# Patient Record
Sex: FEMALE | Race: ASIAN | NOT HISPANIC OR LATINO | Employment: UNEMPLOYED | URBAN - METROPOLITAN AREA
[De-identification: names, ages, dates, MRNs, and addresses within clinical notes are randomized per-mention and may not be internally consistent; named-entity substitution may affect disease eponyms.]

---

## 2022-03-21 ENCOUNTER — APPOINTMENT (OUTPATIENT)
Dept: RADIOLOGY | Facility: HOSPITAL | Age: 72
End: 2022-03-21
Attending: EMERGENCY MEDICINE
Payer: COMMERCIAL

## 2022-03-21 ENCOUNTER — HOSPITAL ENCOUNTER (EMERGENCY)
Facility: HOSPITAL | Age: 72
Discharge: HOME | End: 2022-03-21
Attending: EMERGENCY MEDICINE | Admitting: EMERGENCY MEDICINE
Payer: COMMERCIAL

## 2022-03-21 VITALS
TEMPERATURE: 98.4 F | OXYGEN SATURATION: 95 % | BODY MASS INDEX: 31.54 KG/M2 | DIASTOLIC BLOOD PRESSURE: 87 MMHG | RESPIRATION RATE: 16 BRPM | HEART RATE: 79 BPM | WEIGHT: 126 LBS | SYSTOLIC BLOOD PRESSURE: 155 MMHG

## 2022-03-21 DIAGNOSIS — S49.92XA SHOULDER INJURY, LEFT, INITIAL ENCOUNTER: ICD-10-CM

## 2022-03-21 PROBLEM — J10.1 INFLUENZA A (H1N1): Status: ACTIVE | Noted: 2020-01-28

## 2022-03-21 PROBLEM — K80.20 CALCULUS OF GALLBLADDER WITHOUT CHOLECYSTITIS WITHOUT OBSTRUCTION: Status: ACTIVE | Noted: 2020-01-26

## 2022-03-21 PROBLEM — R10.13 EPIGASTRIC PAIN: Status: ACTIVE | Noted: 2020-01-26

## 2022-03-21 PROBLEM — Z86.73 HISTORY OF CVA (CEREBROVASCULAR ACCIDENT): Status: ACTIVE | Noted: 2020-01-26

## 2022-03-21 PROBLEM — R65.20 SEVERE SEPSIS (H): Status: ACTIVE | Noted: 2020-01-26

## 2022-03-21 PROBLEM — J86.9 EMPYEMA (H): Status: ACTIVE | Noted: 2020-01-26

## 2022-03-21 PROBLEM — N81.9 PELVIC PROLAPSE: Status: ACTIVE | Noted: 2018-08-15

## 2022-03-21 PROBLEM — A41.9 SEVERE SEPSIS (H): Status: ACTIVE | Noted: 2020-01-26

## 2022-03-21 PROBLEM — B96.20 E COLI BACTEREMIA: Status: ACTIVE | Noted: 2020-01-28

## 2022-03-21 PROBLEM — R78.81 E COLI BACTEREMIA: Status: ACTIVE | Noted: 2020-01-28

## 2022-03-21 LAB
ANION GAP SERPL CALCULATED.3IONS-SCNC: 9 MMOL/L (ref 5–18)
BUN SERPL-MCNC: 9 MG/DL (ref 8–28)
CALCIUM SERPL-MCNC: 8.8 MG/DL (ref 8.5–10.5)
CHLORIDE BLD-SCNC: 106 MMOL/L (ref 98–107)
CO2 SERPL-SCNC: 25 MMOL/L (ref 22–31)
CREAT SERPL-MCNC: 0.66 MG/DL (ref 0.6–1.1)
ERYTHROCYTE [DISTWIDTH] IN BLOOD BY AUTOMATED COUNT: 13 % (ref 10–15)
GFR SERPL CREATININE-BSD FRML MDRD: >90 ML/MIN/1.73M2
GLUCOSE BLD-MCNC: 112 MG/DL (ref 70–125)
HCT VFR BLD AUTO: 42 % (ref 35–47)
HGB BLD-MCNC: 14.3 G/DL (ref 11.7–15.7)
HOLD SPECIMEN: NORMAL
MCH RBC QN AUTO: 32.1 PG (ref 26.5–33)
MCHC RBC AUTO-ENTMCNC: 34 G/DL (ref 31.5–36.5)
MCV RBC AUTO: 94 FL (ref 78–100)
PLATELET # BLD AUTO: 274 10E3/UL (ref 150–450)
POTASSIUM BLD-SCNC: 4.3 MMOL/L (ref 3.5–5)
RBC # BLD AUTO: 4.46 10E6/UL (ref 3.8–5.2)
SODIUM SERPL-SCNC: 140 MMOL/L (ref 136–145)
WBC # BLD AUTO: 7.7 10E3/UL (ref 4–11)

## 2022-03-21 PROCEDURE — 73030 X-RAY EXAM OF SHOULDER: CPT | Mod: LT

## 2022-03-21 PROCEDURE — 85027 COMPLETE CBC AUTOMATED: CPT | Performed by: EMERGENCY MEDICINE

## 2022-03-21 PROCEDURE — 250N000013 HC RX MED GY IP 250 OP 250 PS 637: Performed by: EMERGENCY MEDICINE

## 2022-03-21 PROCEDURE — 36415 COLL VENOUS BLD VENIPUNCTURE: CPT | Performed by: EMERGENCY MEDICINE

## 2022-03-21 PROCEDURE — 82310 ASSAY OF CALCIUM: CPT | Performed by: EMERGENCY MEDICINE

## 2022-03-21 PROCEDURE — 99284 EMERGENCY DEPT VISIT MOD MDM: CPT

## 2022-03-21 PROCEDURE — 73060 X-RAY EXAM OF HUMERUS: CPT | Mod: LT

## 2022-03-21 RX ORDER — ACETAMINOPHEN 500 MG
1000 TABLET ORAL 3 TIMES DAILY
Qty: 30 TABLET | Refills: 0 | Status: SHIPPED | OUTPATIENT
Start: 2022-03-21

## 2022-03-21 RX ORDER — IBUPROFEN 600 MG/1
600 TABLET, FILM COATED ORAL EVERY 6 HOURS PRN
Qty: 30 TABLET | Refills: 0 | Status: SHIPPED | OUTPATIENT
Start: 2022-03-21

## 2022-03-21 RX ORDER — ACETAMINOPHEN 325 MG/1
975 TABLET ORAL ONCE
Status: COMPLETED | OUTPATIENT
Start: 2022-03-21 | End: 2022-03-21

## 2022-03-21 RX ADMIN — ACETAMINOPHEN 975 MG: 325 TABLET ORAL at 09:46

## 2022-03-21 ASSESSMENT — ENCOUNTER SYMPTOMS
WEAKNESS: 1
SPEECH DIFFICULTY: 0
FACIAL ASYMMETRY: 0
NECK PAIN: 0
ARTHRALGIAS: 0
BACK PAIN: 0
HEADACHES: 0

## 2022-03-21 NOTE — DISCHARGE INSTRUCTIONS
I recommend taking 600 mg of ibuprofen 3 times a day.  You can also take 1000 mg of Tylenol 3 times a day.  It is okay to take these at the same time.  After 2 days, please take the medication only as needed.    Please follow-up with some orthopedics.  The x-rays were normal (no fracture of the bone), but there may be some injury to the ligaments or tendons.  Use the sling to rest the arm.  You can use ice as needed throughout the day as well.

## 2022-03-21 NOTE — Clinical Note
Shanell Mendozao accompanied Benny Proctor to the emergency department on 3/21/2022. They may return to work on 03/22/2022.      If you have any questions or concerns, please don't hesitate to call.      Zaire Andino MD

## 2022-03-21 NOTE — ED PROVIDER NOTES
EMERGENCY DEPARTMENT ENCOUNTER      NAME: Benny Proctor  AGE: 71 year old female  YOB: 1950  MRN: [id number]  EVALUATION DATE & TIME: 3/21/2022  7:22 AM    PCP: Nicole Dillon    ED PROVIDER: Zaire Andino M.D.      Chief Complaint   Patient presents with     Generalized Weakness     Arm Pain         FINAL IMPRESSION:  1. Shoulder injury, left, initial encounter          ED COURSE & MEDICAL DECISION MAKING:    Pertinent Labs & Imaging studies reviewed. (See chart for details)  ED Course as of 03/21/22 1108   Mon Mar 21, 2022   0809 Patient is a 71-year-old woman with history of stroke and chronic left arm and leg weakness, here for left shoulder and upper arm pain after a fall a week ago.  She has bruising proximal left arm, chronic weakness of that arm but guarding with any range movement due to pain.  Plan to screen with x-ray for fracture or subluxation.  Basic blood work to rule out signs of infection, anemia that would predispose her to feeling more weak or tired than normal, although it appears as though the pain is the primary reason she is here.  She did bump her head a week ago but no headache, neck pain, dizziness or signs of intracranial hemorrhage that would require CT scan.  She is not on blood thinners.   1000 XR Shoulder Left G/E 3 Views   There is no evidence of an acute left shoulder or humerus fracture. Mild glenohumeral joint degenerative change. Bones are demineralized.    I updated the patient and son about x-ray results.  She was given a sling, we discussed plan for NSAIDs, follow-up with [facility].  She likely has a rotator cuff injury on the side of pain has been persistent for the past week.      Additional ED Course Timestamps:  7:50 AM I met with the patient to gather history and to perform my initial exam. I discussed the plan for care while in the Emergency Department. PPE (gloves,  face shield, N95 mask) was worn by me during patient encounters while patient wore  mask.   10:02 AM I re-evaluated and updated patient. We discussed plans for discharge and patient is agreeable.      At the conclusion of the encounter I discussed the results of all of the tests and the disposition. The questions were answered. The patient or family acknowledged understanding and was agreeable with the care plan.       MEDICATIONS GIVEN IN THE EMERGENCY:  Medications   acetaminophen (TYLENOL) tablet 975 mg (975 mg Oral Given 3/21/22 0946)         NEW PRESCRIPTIONS STARTED AT TODAY'S ER VISIT  Discharge Medication List as of 3/21/2022 10:11 AM      START taking these medications    Details   acetaminophen (TYLENOL) 500 MG tablet Take 2 tablets (1,000 mg) by mouth 3 times daily, Disp-30 tablet, R-0, Local Print      ibuprofen (ADVIL/MOTRIN) 600 MG tablet Take 1 tablet (600 mg) by mouth every 6 hours as needed for moderate pain, Disp-30 tablet, R-0, Local Print                =================================================================    HPI    Patient information was obtained from: The patient and the patient's son    Use of : Yes (Patient's Son - In Person) - Language Deanna Proctor is a 71 year old female with a pertinent history of acute cerebrovascular disease, and CVA with left sided deficits who presents to this ED for evaluation of shoulder pain.     The patient reports she has been experiencing worsening long time left and arm weakness. Per the patient's son, the patient had a unwitnessed mechanical fall 1 week ago and hit her head on the carpet, but did not lose consciousness. Since the fall, the patient has been experiencing severe left shoulder pain that is worsened by lifting the arm. The pain is localized to her anterior left shoulder and denies pain in her head, back, neck, hips, legs, and elbows. She does have a bruise to left anterior upper arm. The patient has been using icy hot, massages, and tylenol for the pain with little or no relief. The patient denies  any speech difficulty or facial asymmetry. The patient reports they typically have left sided weakness at baseline due to a past CVA, but this morning the weakness felt worse and it was more difficult than usual to get out of bed. Patient denies additional medical concerns or complaints at this time.       REVIEW OF SYSTEMS   Review of Systems   HENT:        Positive for head trauma.   Musculoskeletal: Negative for arthralgias (hips; legs; elbows), back pain and neck pain.        Positive for left shoulder pain   Neurological: Positive for weakness (Left sided; acute on chronic). Negative for facial asymmetry, speech difficulty and headaches.        Negative for loss of consciousness.   All other systems reviewed and are negative.    PAST MEDICAL HISTORY:  History reviewed. No pertinent past medical history.    PAST SURGICAL HISTORY:  History reviewed. No pertinent surgical history.        CURRENT MEDICATIONS:    No current facility-administered medications for this encounter.     Current Outpatient Medications   Medication     acetaminophen (TYLENOL) 500 MG tablet     ibuprofen (ADVIL/MOTRIN) 600 MG tablet       ALLERGIES:  No Known Allergies    FAMILY HISTORY:  History reviewed. No pertinent family history.    SOCIAL HISTORY:   Social History     Socioeconomic History     Marital status: Single     Spouse name: None     Number of children: None     Years of education: None     Highest education level: None   Occupational History     None   Tobacco Use     Smoking status: None     Smokeless tobacco: None   Substance and Sexual Activity     Alcohol use: None     Drug use: None     Sexual activity: None   Other Topics Concern     None   Social History Narrative     None     Social Determinants of Health     Financial Resource Strain: Not on file   Food Insecurity: Not on file   Transportation Needs: Not on file   Physical Activity: Not on file   Stress: Not on file   Social Connections: Not on file   Intimate Partner  Violence: Not on file   Housing Stability: Not on file       VITALS:  BP (!) 155/87   Pulse 79   Temp 98.4  F (36.9  C) (Oral)   Resp 16   Wt 57.2 kg (126 lb)   SpO2 95%   BMI 31.54 kg/m      PHYSICAL EXAM    Constitutional: Well developed, well nourished. Comfortable appearing.  HENT: Normocephalic, atraumatic, mucous membranes moist, nose normal. Neck- Supple, gross ROM intact.   Eyes: Pupils mid-range, conjunctiva without injection, no discharge.   Respiratory: Clear to auscultation bilaterally, no respiratory distress, no wheezing, speaks full sentences easily. No cough.  Cardiovascular: Normal heart rate, regular rhythm, no murmurs. No pedal edema, 2+ DP pulses.   GI: Soft, no tenderness to deep palpation in all quadrants, no masses.  Musculoskeletal: Bruising without deformity to anterior left upper arm and tenderness from mid-humerus up to anterior shoulder. Guarding to range of motion of shoulder due to pain.   Skin: Warm, dry, no rash.  Neurologic: Alert & oriented x 3, cranial nerves grossly intact.  Resists movement of the left shoulder due to pain, weakness of the left hand, foot, baseline per patient and son.  Psychiatric: Affect normal, cooperative.       LAB:  All pertinent labs reviewed and interpreted.  Labs Ordered and Resulted from Time of ED Arrival to Time of ED Departure   CBC WITH PLATELETS - Normal       Result Value    WBC Count 7.7      RBC Count 4.46      Hemoglobin 14.3      Hematocrit 42.0      MCV 94      MCH 32.1      MCHC 34.0      RDW 13.0      Platelet Count 274     BASIC METABOLIC PANEL   ROUTINE UA WITH MICROSCOPIC REFLEX TO CULTURE       RADIOLOGY:  Reviewed all pertinent imaging. Please see official radiology report.  Humerus XR,  G/E 2 views, left   Final Result   IMPRESSION: There is no evidence of an acute left shoulder or humerus fracture. Mild glenohumeral joint degenerative change. Bones are demineralized.       XR Shoulder Left G/E 3 Views   Final Result    IMPRESSION: There is no evidence of an acute left shoulder or humerus fracture. Mild glenohumeral joint degenerative change. Bones are demineralized.           EKG:    All EKG interpretations will be found in ED course above.      I, Erika Jean am serving as a scribe to document services personally performed by Dr. Zaire Andino based on my observation and the provider's statements to me. I, Zaire Andino MD attest that Erika Jean is acting in a scribe capacity, has observed my performance of the services and has documented them in accordance with my direction.    Zaire Andino M.D.  Emergency Medicine  [facility] EMERGENCY DEPARTMENT  [facility] [facility] [id number] [phone]  Dept: [phone]     Zaire Andino MD  03/21/22 1104       Zaire Andino MD  03/21/22 0212

## 2022-03-21 NOTE — ED TRIAGE NOTES
"Pt arrives via wheelchair to triage with her son. Pt prefers son as  today. Pt called son and reports that she is increasingly weak and having increased pain on the left side and particularly left arm into shoulder 9/10 pain. Pt had a fall approx. 1 week ago hurting the left arm was not seen for this. Hx: stroke approx 20 years ago w/ left sided deficits present today. Deficits \"a little bit worse today\" per son.   "

## 2023-10-04 ENCOUNTER — TRANSFERRED RECORDS (OUTPATIENT)
Dept: HEALTH INFORMATION MANAGEMENT | Facility: CLINIC | Age: 73
End: 2023-10-04

## 2024-07-29 ENCOUNTER — TRANSFERRED RECORDS (OUTPATIENT)
Dept: HEALTH INFORMATION MANAGEMENT | Facility: CLINIC | Age: 74
End: 2024-07-29

## 2025-04-07 ENCOUNTER — TELEPHONE (OUTPATIENT)
Dept: FAMILY MEDICINE | Facility: CLINIC | Age: 75
End: 2025-04-07
Payer: COMMERCIAL

## 2025-04-07 NOTE — TELEPHONE ENCOUNTER
Called and left message for patients daughter asking for a call back.     If patient's daughter calls back please let her know that the VINOD to [facility] was not singed. Therefore, we cannot fax this to receive  her records. Patient and patient's daughter need to come in and sign form along with a consent to communicate.

## 2025-06-09 ENCOUNTER — TELEPHONE (OUTPATIENT)
Dept: FAMILY MEDICINE | Facility: CLINIC | Age: 75
End: 2025-06-09
Payer: COMMERCIAL

## 2025-06-09 NOTE — TELEPHONE ENCOUNTER
Forms/Letter Request    Type of form/letter: DME (wheelchair, hospital bed)    Type of DME requested: Incontinence Supplies    Do we have the form/letter: Yes: Placed in annita in box    Who is the form from? [facility]    Where did/will the form come from? form was faxed in    When is form/letter needed by: when done    How would you like the form/letter returned: Fax : [phone]

## 2025-06-13 DIAGNOSIS — Z86.73 HISTORY OF CVA (CEREBROVASCULAR ACCIDENT): Primary | ICD-10-CM

## 2025-06-13 DIAGNOSIS — M21.379 FOOT-DROP, UNSPECIFIED LATERALITY: ICD-10-CM

## 2025-06-18 ENCOUNTER — MEDICAL CORRESPONDENCE (OUTPATIENT)
Dept: HEALTH INFORMATION MANAGEMENT | Facility: CLINIC | Age: 75
End: 2025-06-18
Payer: COMMERCIAL

## 2025-06-25 ENCOUNTER — PATIENT OUTREACH (OUTPATIENT)
Dept: GERIATRIC MEDICINE | Facility: CLINIC | Age: 75
End: 2025-06-25
Payer: COMMERCIAL

## 2025-06-25 NOTE — PROGRESS NOTES
[facility] Care Coordination Contact    [facility] Change (Transfer)    Member is new enrollee to [facility] effective 06/01 with [facility] health plan. Member transferred from [facility].    No home visit required because this care coordinator (CC) has received all required documentation from the previous CC.    Called member and introduced self as member's new CC. Confirmed with member that the welcome letter with writer's name and contact information has been received.  Reviewed [employer] Assessment/Health Risk Assessment (HRA) and Support Plan with member. No changes noted.  Transitional HRA completed. Support Plan updated and reflects current services.  Required referral authorization information communicated to CMS: Yes    Writer reviewed the following with member:    ER visits: No  Hospitalizations: No  TCU stays: No  Significant health status changes: Denies  Falls/Injuries: No  ADL/IADL changes: No  Changes in services: No    Follow-Up Plan: Member informed of future contact, plan to f/u with member with at next regularly scheduled contact.  Contact information shared with member and family, encouraged member to call with any questions or concerns.    Requested to increase pull ups from one pack a month to two packs per month.    This CC note routed to PCP, Yani Álvarez, RN  [facility]  [phone]

## 2025-06-25 NOTE — Clinical Note
FYI Care Coordination through [facility] starting 06/01/2025.  Thanks,  Monique Moncada RN [facility] [phone]

## 2025-07-02 DIAGNOSIS — F03.94 DEMENTIA WITH ANXIETY, UNSPECIFIED DEMENTIA SEVERITY, UNSPECIFIED DEMENTIA TYPE (H): ICD-10-CM

## 2025-07-02 DIAGNOSIS — R41.89 COGNITIVE IMPAIRMENT: ICD-10-CM

## 2025-07-03 RX ORDER — DONEPEZIL HYDROCHLORIDE 5 MG/1
5 TABLET, FILM COATED ORAL AT BEDTIME
Qty: 90 TABLET | Refills: 2 | Status: SHIPPED | OUTPATIENT
Start: 2025-07-03

## 2025-07-30 ENCOUNTER — TELEPHONE (OUTPATIENT)
Dept: FAMILY MEDICINE | Facility: CLINIC | Age: 75
End: 2025-07-30
Payer: COMMERCIAL

## 2025-07-30 NOTE — TELEPHONE ENCOUNTER
Hello -    Here are my comments about the recent results: A1c is stable 8.3.    Regards,   Mj Reynolds MD  6/10/25 DME orders signed for incontinence supplies note received. Printed from chart and faxed again.